# Patient Record
Sex: FEMALE | Race: BLACK OR AFRICAN AMERICAN | HISPANIC OR LATINO | URBAN - METROPOLITAN AREA
[De-identification: names, ages, dates, MRNs, and addresses within clinical notes are randomized per-mention and may not be internally consistent; named-entity substitution may affect disease eponyms.]

---

## 2022-03-23 ENCOUNTER — TELEPHONE (OUTPATIENT)
Dept: OBSTETRICS AND GYNECOLOGY | Facility: CLINIC | Age: 32
End: 2022-03-23
Payer: COMMERCIAL

## 2022-03-23 NOTE — TELEPHONE ENCOUNTER
----- Message from Veena Pbalo sent at 3/23/2022 12:03 PM CDT -----  Regarding: Establish Care  Type:  Needs Medical Advice    Who Called: pt    Would the patient rather a call back or a response via [facility]? Call    Best Call Back Number: [phone]    Additional Information: would like an appt this Friday if possible